# Patient Record
Sex: FEMALE | URBAN - NONMETROPOLITAN AREA
[De-identification: names, ages, dates, MRNs, and addresses within clinical notes are randomized per-mention and may not be internally consistent; named-entity substitution may affect disease eponyms.]

---

## 2025-01-07 ENCOUNTER — TELEPHONE (OUTPATIENT)
Dept: BARIATRICS/WEIGHT MGMT | Age: 25
End: 2025-01-07

## 2025-01-07 NOTE — TELEPHONE ENCOUNTER
Thank you for choosing us and making this decision to prioritize your health- we have non-surgical weight loss options available for you.  The initial visit will be a review your health/weight history, listen to your goals, allow you to ask questions and discuss how our team can help you.  You will see our PA first and then follow up with at least 1 appt with our RD-it is our policy that all diet medication patients meet with our RD for 1 visit.  You will see the prescribing provider each month in accordance with the [place] prescribing laws.    If there is pushback from the patient regarding RD visit- explain that all AOM should be used with a reduced calorie diet and increased physical activity-also the RD will educate them on protein intake when on diet meds to prevent muscle wasting.  Explain fee of $50 to see RD - PA visit will be sent to insurance provider.  Co-pay is expected and due at initial visit.  Track your nutrition intake for 3 days prior to your appt & bring to RD appt.    Mail Wt. Mgmt. weight & health hx form & Weight loss goal prep form.  Gather brief hx on initial phone call to assess for Contraindications to these meds-  -What medication are you interested in:   Adipex  Adipex - ask pt if they have HTN, cardiac condition, past hx substance abuse, taking depression meds currently. Currently taking Cymbalta and Buspar  Qysmia - ask pt if they have HTN, cardiac condition, past hx substance abuse, taking depression meds currently.  Wegovy - ask pt to call their Insurance and ask if they have coverage for Wegovy for the diagnosis of obesity.  At this time we are not prescribing Wegovy because the supply is so low-our rep is updating us but she feels the supply won't increase until early next year.   Contrave-Advise pt to call their insurance to see if they have coverage -otherwise contrave will cost them $100 out of pocket each month.  Ozempic and Mounjaro -Our office is not prescribing these

## 2025-05-22 ENCOUNTER — LAB (OUTPATIENT)
Dept: LAB | Age: 25
End: 2025-05-22

## 2025-06-01 LAB — CYTOLOGY THIN PREP PAP: NORMAL
